# Patient Record
Sex: FEMALE | Race: BLACK OR AFRICAN AMERICAN | NOT HISPANIC OR LATINO | Employment: UNEMPLOYED | URBAN - METROPOLITAN AREA
[De-identification: names, ages, dates, MRNs, and addresses within clinical notes are randomized per-mention and may not be internally consistent; named-entity substitution may affect disease eponyms.]

---

## 2022-11-09 ENCOUNTER — TELEPHONE (OUTPATIENT)
Dept: GASTROENTEROLOGY | Facility: CLINIC | Age: 52
End: 2022-11-09

## 2022-11-09 NOTE — TELEPHONE ENCOUNTER
Spoke to pt regarding message below. Informed pt I don't see an referral in epic and provided pt with the number and fax number to [facility] concierge dept. Pt verbalize understand and thank me   ----- Message from Antonette Gray sent at 11/9/2022  2:45 PM CST -----  Contact: pt  Pt states that paperwork and medical records should have been sent over to the office for her already. She states that she needs to make an appt, but she is unsure who to schedule with for her particular situation and would like to speak to someone in the office to be scheduled.    Contact Number: [phone]

## 2022-12-14 ENCOUNTER — TELEPHONE (OUTPATIENT)
Dept: ENDOSCOPY | Facility: HOSPITAL | Age: 52
End: 2022-12-14
Payer: COMMERCIAL

## 2022-12-14 NOTE — TELEPHONE ENCOUNTER
----- Message from Dodie Edmond sent at 12/14/2022 11:38 AM CST -----  Contact: [phone]  PT, would like to Scheduled a  Appointment, please contact @ number provided      [phone]     ED, Referral Doctor, Dr. Jesika Acuña, would like Pt to see a Gastro Doctor for     pain and fluid in the pt, abdomen    Also, Anemia

## 2023-02-06 ENCOUNTER — TELEPHONE (OUTPATIENT)
Dept: GASTROENTEROLOGY | Facility: CLINIC | Age: 53
End: 2023-02-06
Payer: COMMERCIAL

## 2023-02-06 NOTE — TELEPHONE ENCOUNTER
----- Message from Joeclyn Bradley sent at 2/6/2023 11:11 AM CST -----  Regarding: Appt  Contact: pt [phone]  [facility] is calling to check the status of referral sent, please call

## 2023-02-06 NOTE — TELEPHONE ENCOUNTER
Return call to patient inform patient that we have not received the records and she can send to [phone].     Patient verbalized understanding.

## 2023-02-22 ENCOUNTER — TELEPHONE (OUTPATIENT)
Dept: ENDOSCOPY | Facility: HOSPITAL | Age: 53
End: 2023-02-22

## 2023-02-22 ENCOUNTER — TELEPHONE (OUTPATIENT)
Dept: TRANSPLANT | Facility: CLINIC | Age: 53
End: 2023-02-22
Payer: COMMERCIAL

## 2023-02-22 NOTE — TELEPHONE ENCOUNTER
----- Message from Edith Song sent at 2/22/2023 12:09 PM CST -----  I rec'clifton a call from Jesika leal/ Dr. Hardy Calixto(P: [phone]) about get this pt miguel'ed for a double-balloon endoscopy. Her recs have been scanned into media.

## 2023-02-22 NOTE — TELEPHONE ENCOUNTER
----- Message from Edith Song sent at 2/22/2023 12:16 PM CST -----    Rec'clifton a call from Jesika leal/ Dr. Hardy Calixto(P: [phone]) about get this pt miguel'ed to see Dr. Baeza for a double-balloon endoscopy. Explained that this is the office of Dr. MERCY leal/hepat, not RYLIE leal/GI. Told her that I will send a message to GI for them to get them to give the pt a call.  .

## 2023-05-23 ENCOUNTER — TELEPHONE (OUTPATIENT)
Dept: TRANSPLANT | Facility: CLINIC | Age: 53
End: 2023-05-23
Payer: COMMERCIAL

## 2023-05-25 ENCOUNTER — TELEPHONE (OUTPATIENT)
Dept: TRANSPLANT | Facility: CLINIC | Age: 53
End: 2023-05-25
Payer: COMMERCIAL

## 2023-05-26 ENCOUNTER — TELEPHONE (OUTPATIENT)
Dept: TRANSPLANT | Facility: CLINIC | Age: 53
End: 2023-05-26
Payer: COMMERCIAL

## 2023-05-26 DIAGNOSIS — Z76.82 ORGAN TRANSPLANT CANDIDATE: Primary | ICD-10-CM

## 2023-07-20 ENCOUNTER — TELEPHONE (OUTPATIENT)
Dept: TRANSPLANT | Facility: CLINIC | Age: 53
End: 2023-07-20
Payer: COMMERCIAL

## 2023-07-20 NOTE — TELEPHONE ENCOUNTER
----- Message from Ariana Han sent at 7/20/2023 11:55 AM CDT -----  Regarding: confirm 7/27 appt  The patient called confirming appts on 7/27    No further information provided      Patient can be contacted @# [phone] (home)

## 2023-07-21 ENCOUNTER — TELEPHONE (OUTPATIENT)
Dept: TRANSPLANT | Facility: CLINIC | Age: 53
End: 2023-07-21
Payer: COMMERCIAL

## 2023-07-25 ENCOUNTER — TELEPHONE (OUTPATIENT)
Dept: TRANSPLANT | Facility: CLINIC | Age: 53
End: 2023-07-25
Payer: COMMERCIAL

## 2023-07-26 ENCOUNTER — TELEPHONE (OUTPATIENT)
Dept: TRANSPLANT | Facility: CLINIC | Age: 53
End: 2023-07-26
Payer: COMMERCIAL

## 2023-07-26 NOTE — TELEPHONE ENCOUNTER
MA notes per adherence form     FOR THE PAST THREE MONTHS:    0-AMA's  0-No-shows    No concerns with care giving, transportation, or mental health    Brought over to clinic to be scanned in.    Sheba Alberts  Abdominal Transplant MA

## 2023-07-27 ENCOUNTER — HOSPITAL ENCOUNTER (OUTPATIENT)
Dept: RADIOLOGY | Facility: HOSPITAL | Age: 53
Discharge: HOME | End: 2023-07-27
Payer: MEDICARE

## 2023-07-27 ENCOUNTER — DOCUMENTATION ONLY (OUTPATIENT)
Dept: TRANSPLANT | Facility: CLINIC | Age: 53
End: 2023-07-27

## 2023-07-27 ENCOUNTER — TELEPHONE (OUTPATIENT)
Dept: TRANSPLANT | Facility: CLINIC | Age: 53
End: 2023-07-27
Payer: COMMERCIAL

## 2023-07-27 ENCOUNTER — OFFICE VISIT (OUTPATIENT)
Dept: TRANSPLANT | Facility: CLINIC | Age: 53
End: 2023-07-27
Payer: MEDICARE

## 2023-07-27 VITALS
HEART RATE: 72 BPM | DIASTOLIC BLOOD PRESSURE: 52 MMHG | WEIGHT: 182.13 LBS | RESPIRATION RATE: 16 BRPM | HEIGHT: 65 IN | BODY MASS INDEX: 30.34 KG/M2 | TEMPERATURE: 98 F | OXYGEN SATURATION: 98 % | SYSTOLIC BLOOD PRESSURE: 95 MMHG

## 2023-07-27 DIAGNOSIS — Z76.82 ORGAN TRANSPLANT CANDIDATE: ICD-10-CM

## 2023-07-27 DIAGNOSIS — Z99.2 ESRD ON HEMODIALYSIS: ICD-10-CM

## 2023-07-27 DIAGNOSIS — N05.1 FSGS (FOCAL SEGMENTAL GLOMERULOSCLEROSIS): ICD-10-CM

## 2023-07-27 DIAGNOSIS — Z95.1 PRESENCE OF AORTOCORONARY BYPASS GRAFT: ICD-10-CM

## 2023-07-27 DIAGNOSIS — N18.6 ESRD ON HEMODIALYSIS: ICD-10-CM

## 2023-07-27 DIAGNOSIS — T86.12 FAILED KIDNEY TRANSPLANT: ICD-10-CM

## 2023-07-27 DIAGNOSIS — Z01.818 PRE-TRANSPLANT EVALUATION FOR KIDNEY TRANSPLANT: Primary | ICD-10-CM

## 2023-07-27 PROBLEM — K21.9 GASTROESOPHAGEAL REFLUX DISEASE: Status: ACTIVE | Noted: 2023-01-23

## 2023-07-27 PROCEDURE — 99215 OFFICE O/P EST HI 40 MIN: CPT | Mod: PBBFAC,25,TXP | Performed by: NURSE PRACTITIONER

## 2023-07-27 PROCEDURE — 99204 OFFICE O/P NEW MOD 45 MIN: CPT | Mod: S$PBB,TXP,, | Performed by: PHYSICIAN ASSISTANT

## 2023-07-27 PROCEDURE — 99205 PR OFFICE/OUTPT VISIT, NEW, LEVL V, 60-74 MIN: ICD-10-PCS | Mod: S$PBB,TXP,, | Performed by: NURSE PRACTITIONER

## 2023-07-27 PROCEDURE — 76770 US RETROPERITONEAL COMPLETE: ICD-10-PCS | Mod: 26,TXP,, | Performed by: RADIOLOGY

## 2023-07-27 PROCEDURE — 99205 OFFICE O/P NEW HI 60 MIN: CPT | Mod: S$PBB,TXP,, | Performed by: NURSE PRACTITIONER

## 2023-07-27 PROCEDURE — 93978 VASCULAR STUDY: CPT | Mod: TC,TXP

## 2023-07-27 PROCEDURE — 99204 PR OFFICE/OUTPT VISIT, NEW, LEVL IV, 45-59 MIN: ICD-10-PCS | Mod: S$PBB,TXP,, | Performed by: TRANSPLANT SURGERY

## 2023-07-27 PROCEDURE — 76776 US EXAM K TRANSPL W/DOPPLER: CPT | Mod: 26,TXP,, | Performed by: RADIOLOGY

## 2023-07-27 PROCEDURE — 76770 US EXAM ABDO BACK WALL COMP: CPT | Mod: 26,TXP,, | Performed by: RADIOLOGY

## 2023-07-27 PROCEDURE — 93978 VASCULAR STUDY: CPT | Mod: 26,TXP,, | Performed by: RADIOLOGY

## 2023-07-27 PROCEDURE — 99204 PR OFFICE/OUTPT VISIT, NEW, LEVL IV, 45-59 MIN: ICD-10-PCS | Mod: S$PBB,TXP,, | Performed by: PHYSICIAN ASSISTANT

## 2023-07-27 PROCEDURE — 99204 OFFICE O/P NEW MOD 45 MIN: CPT | Mod: S$PBB,TXP,, | Performed by: TRANSPLANT SURGERY

## 2023-07-27 PROCEDURE — 93978 US DOPP ILIACS BILATERAL: ICD-10-PCS | Mod: 26,TXP,, | Performed by: RADIOLOGY

## 2023-07-27 PROCEDURE — 99999 PR PBB SHADOW E&M-EST. PATIENT-LVL V: CPT | Mod: PBBFAC,TXP,, | Performed by: NURSE PRACTITIONER

## 2023-07-27 PROCEDURE — 99999 PR PBB SHADOW E&M-EST. PATIENT-LVL V: ICD-10-PCS | Mod: PBBFAC,TXP,, | Performed by: NURSE PRACTITIONER

## 2023-07-27 PROCEDURE — 76776 US TRANSPLANT KIDNEY WITH DOPPLER: ICD-10-PCS | Mod: 26,TXP,, | Performed by: RADIOLOGY

## 2023-07-27 PROCEDURE — 76776 US EXAM K TRANSPL W/DOPPLER: CPT | Mod: TC,TXP

## 2023-07-27 PROCEDURE — 76770 US EXAM ABDO BACK WALL COMP: CPT | Mod: TC,TXP

## 2023-07-27 RX ORDER — ISOSORBIDE MONONITRATE 60 MG/1
60 TABLET, EXTENDED RELEASE ORAL DAILY
COMMUNITY
Start: 2023-06-02

## 2023-07-27 RX ORDER — AMLODIPINE BESYLATE 10 MG/1
10 TABLET ORAL DAILY
COMMUNITY
Start: 2023-06-02

## 2023-07-27 RX ORDER — LOSARTAN POTASSIUM 100 MG/1
100 TABLET ORAL NIGHTLY
COMMUNITY
Start: 2023-06-02

## 2023-07-27 RX ORDER — FLUOXETINE HYDROCHLORIDE 40 MG/1
40 CAPSULE ORAL DAILY
COMMUNITY
Start: 2023-06-05

## 2023-07-27 RX ORDER — FAMOTIDINE 40 MG/1
40 TABLET, FILM COATED ORAL 2 TIMES DAILY
COMMUNITY
Start: 2023-06-02

## 2023-07-27 RX ORDER — PANTOPRAZOLE SODIUM 40 MG/1
40 TABLET, DELAYED RELEASE ORAL DAILY
COMMUNITY
Start: 2023-07-22

## 2023-07-27 RX ORDER — ASPIRIN 81 MG/1
81 TABLET ORAL DAILY
COMMUNITY
Start: 2023-06-02

## 2023-07-27 RX ORDER — ATORVASTATIN CALCIUM 40 MG/1
40 TABLET, FILM COATED ORAL DAILY
COMMUNITY
Start: 2023-05-08

## 2023-07-27 RX ORDER — CARVEDILOL 25 MG/1
25 TABLET ORAL 2 TIMES DAILY
COMMUNITY
Start: 2023-06-02

## 2023-07-27 NOTE — TELEPHONE ENCOUNTER
Reviewed pt transplant labs.  Notified dialysis unit dietitian of the following abnormal labs via fax and requested their most recent nutrition note on this pt.  Once this note is received it will be scanned into pt's chart.     Cholesterol 109  Albumin 3.4

## 2023-07-27 NOTE — PROGRESS NOTES
INITIAL PATIENT EDUCATION NOTE    Ms. Lupe Richards was seen in pre-kidney transplant clinic for evaluation for kidney, kidney/pancreas or pancreas only transplant.  The patient attended a an individual video education session that discussed/reviewed the following aspects of transplantation: evaluation including diagnostic and laboratory testing,( Chemistries, Hematology, Serologies including HIV and Hepatitis and HLA) required for transplantation and selection committee process, UNOS waitlist management/multiple listings, types of organs offered (KDPI < 85%, KDPI > 85%, PHS risk, DCD, HCV+, HIV+ for HIV+ recipients and enbloc/dual), financial aspects, surgical procedures, dietary instruction pre- and post-transplant, health maintenance pre- and post-transplant, post-transplant hospitalization and outpatient follow-up, potential to participate in a research protocol, and medication management and side effects.  A question and answer session was provided after the presentation.    The patient was seen by all members of the multi-disciplinary team to include: Nephrologist/MAITE, Surgeon, , Transplant Coordinator, , Pharmacist and Dietician (if applicable).    The patient reviewed and signed all consents for evaluation which were witnessed and sent to scanning into the [facility] chart.    The patient was given an education book and plan for further evaluation based on her individual assessment.      Reviewed program requirement for complete COVID vaccination with documentation prior to listing.  COVID education information reviewed with patient. Patient encouraged to be up to date on all vaccinations.       The patient was informed that the transplant team would manage immediate post op pain. If the patient requires long term pain management, they will need to have that pain management addressed by their PCP or previous provider who wrote for long term pain medicines.    The patient was  encouraged to call with any questions or concerns.

## 2023-07-27 NOTE — PROGRESS NOTES
PRE-TRANSPLANT INFECTIOUS DISEASE CONSULT    Reason for Visit:  Pre-transplant evaluation  Referring Provider: Dr. Matt August     History of Present Illness:    52 y.o. 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 presents for pre-kidney transplant evaluation. Pt is on HD. LUE AVF. Pt is anuric.     Infectious History:  Recent hospital admissions: Yes +peritonitis 2/2 peritoneal catheter at [facility].   Recent infections: Yes SBP peritoneal catheter removed   Recent or current antibiotic use: No  History of recurrent infections *(sinus / pneumonia / UTI / SBP)*: No  History of diabetic foot wound or bone/joint infection: No  Recent dental infections, issues or procedures: No  History of chicken pox: Yes  History of shingles: No  History of STI: No  History of COVID infection: Yes  Pt saw ID at [facility] Dr. Briggs  2017 pt was on bactrim for PJP ppx. Possibly for lung nodule.???      History of Immunosuppression:  Prior chemotherapy / immunosuppression: Yes discontinued 1/2021 due to kidney failure.   Prior transplant: Yes kidney transplant at [facility] 2017  History of splenectomy: No    Tuberculosis:  Prior screening for latent TB: Yes  Prior diagnosis of latent TB: No  Risk factors for TB *known exposure, incarceration, homelessness*: No    Geographical exposures:  Currently lives in [place] with mother  Lived in the following states: none  Lived or travelled to the [facility] [place]: No  International travel: No  Travel-associated illness: No    Social/Environmental:  Occupation:  nursing  Pets: Yes outdoor dogs, vaccinated   Livestock: No  Fishing / hunting: No  Hobbies: none  Water: [place]  Consumption of raw/undercooked meat or seafood?  No  Tobacco: No  Alcohol: No  Recreational drug use:  No  Sexual partners: none      Past Histories:   Past Medical History:   Diagnosis Date    Anemia     Coronary artery disease     Disorder of kidney and  ureter     Encounter for blood transfusion     GERD (gastroesophageal reflux disease)     Hyperparathyroidism, unspecified     Hypertension     Kidney transplanted     Pneumonia, unspecified organism      labor without delivery, unspecified trimester     Sleep apnea, unspecified     Thyroid disease      Past Surgical History:   Procedure Laterality Date    BONE MARROW BIOPSY      CARDIAC CATHETERIZATION       SECTION      CORONARY ARTERY BYPASS GRAFT      DILATION AND CURETTAGE OF UTERUS      ESOPHAGOGASTRODUODENOSCOPY      INSERTION OF DIALYSIS CATHETER      KIDNEY TRANSPLANT      PARACENTESIS      PARATHYROIDECTOMY      PERITONEAL CATHETER REMOVAL      REMOVAL, CATHETER, DIALYSIS, PERITONEAL, LAPAROSCOPIC      Dialysis Catherter     Family History   Problem Relation Age of Onset    Arthritis Mother     COPD Mother     Cancer Father         lung     Social History     Tobacco Use    Smoking status: Never    Smokeless tobacco: Never   Substance Use Topics    Alcohol use: Not Currently     Review of patient's allergies indicates:   Allergen Reactions    Losartan      Cough         Immunization History:  Received all childhood vaccines: Yes  All household members receive annual flu vaccine: Yes  All household members are up to date on COVID vaccine: Yes      Current antibiotics:  Antibiotics (From admission, onward)      None              Review of Systems  Review of Systems   Constitutional: Negative for chills, decreased appetite, fever, malaise/fatigue, night sweats, weight gain and weight loss.   HENT:  Negative for congestion, ear pain, hearing loss, hoarse voice, sore throat and tinnitus.    Eyes:  Negative for blurred vision, pain, vision loss in left eye, vision loss in right eye and visual disturbance.   Cardiovascular:  Negative for chest pain, dyspnea on exertion, leg swelling and palpitations.   Respiratory:  Negative for cough, shortness of breath, sputum production and wheezing.    Skin:   Negative for dry skin, itching, rash and suspicious lesions.   Musculoskeletal:  Negative for back pain, joint pain, myalgias and neck pain.   Gastrointestinal:  Negative for abdominal pain, constipation, diarrhea, heartburn, nausea and vomiting.   Genitourinary:  Negative for dysuria, flank pain, frequency, hematuria, hesitancy and urgency.   Neurological:  Negative for dizziness, headaches, numbness, paresthesias and weakness.   Psychiatric/Behavioral:  Negative for depression and memory loss. The patient does not have insomnia and is not nervous/anxious.         Objective  Physical Exam  Vitals and nursing note reviewed.   Constitutional:       General: She is not in acute distress.     Appearance: She is well-developed. She is not diaphoretic.   HENT:      Head: Normocephalic and atraumatic.   Eyes:      Pupils: Pupils are equal, round, and reactive to light.   Cardiovascular:      Rate and Rhythm: Normal rate and regular rhythm.      Heart sounds: Normal heart sounds. No murmur heard.    No friction rub. No gallop.   Pulmonary:      Effort: Pulmonary effort is normal. No respiratory distress.      Breath sounds: Normal breath sounds. No wheezing or rales.   Chest:      Chest wall: No tenderness.   Abdominal:      General: Bowel sounds are normal. There is no distension.      Palpations: Abdomen is soft. There is no mass.      Tenderness: There is no abdominal tenderness. There is no guarding or rebound.      Hernia: No hernia is present.   Musculoskeletal:         General: No tenderness or deformity. Normal range of motion.      Cervical back: Normal range of motion and neck supple.   Skin:     General: Skin is warm and dry.      Coloration: Skin is not pale.      Findings: No erythema.      Comments: EMEILA HERNANDEZ c/d/I    Neurological:      Mental Status: She is alert and oriented to person, place, and time.      Cranial Nerves: No cranial nerve deficit.      Coordination: Coordination normal.   Psychiatric:          Behavior: Behavior normal.         Thought Content: Thought content normal.         Labs:    CBC:   Lab Results   Component Value Date    WBC 3.93 07/27/2023    HGB 12.0 07/27/2023    HCT 36.7 (L) 07/27/2023     (H) 07/27/2023     07/27/2023    GRAN 2.0 07/27/2023    GRAN 50.1 07/27/2023    LYMPH 0.7 (L) 07/27/2023    LYMPH 17.3 (L) 07/27/2023    MONO 0.5 07/27/2023    MONO 13.2 07/27/2023    EOSINOPHIL 18.1 (H) 07/27/2023       Syphilis screening: No results found for: RPR, PRPQ, FTAABS     TB screening: No results found for: TBGOLDPLUS, TSPOTSCREN    HIV screening:   Lab Results   Component Value Date    MFE47GARS Non-reactive 07/27/2023       Strongyloides IgG: No results found for: STRONGANTIGG    Hepatitis Serologies:   Lab Results   Component Value Date    HEPAIGG Non-reactive 07/27/2023    HEPBCAB Non-reactive 07/27/2023    HEPBSAB <3.00 07/27/2023    HEPBSAB Non-reactive 07/27/2023    HEPCAB Non-reactive 07/27/2023        Varicella IgG: No results found for: VARICELLAINT      Immunization History   Administered Date(s) Administered    COVID-19, MRNA, LN-S, PF (Pfizer) (Purple Cap) 02/25/2021, 03/18/2021, 01/14/2022    COVID-19, mRNA, LNP-S, bivalent booster, PF (PFIZER OMICRON) 02/24/2023    DTP 02/15/1973, 03/30/1973, 08/06/1973, 08/31/1974, 08/11/1979    Hepatitis A / Hepatitis B 05/07/2007    Hepatitis B, Adult 10/06/2004, 06/09/2008, 07/17/2008, 08/20/2008, 11/13/2008    Hib-HbOC 08/01/1994    Influenza 10/10/2012    Influenza (FLUBLOK) - Quadrivalent - Recombinant - PF *Preferred* (egg allergy) 09/30/2022    Influenza - Quadrivalent - PF *Preferred* (6 months and older) 01/11/2019, 10/01/2019, 10/11/2021    Influenza - Trivalent - PF (ADULT) 10/29/2009, 10/19/2010, 11/11/2011, 10/18/2013, 11/06/2014, 10/21/2015    MMR 12/09/1993    Measles 12/28/1973    Mumps 08/11/1979    OPV 03/30/1973, 08/06/1973, 08/31/1974, 02/15/1993    Pneumococcal Conjugate - 13 Valent 10/27/2015    Pneumococcal  Polysaccharide - 23 Valent 10/19/2012, 08/01/2022    Rubella 08/11/1979    Td (ADULT) 10/09/1991          Assessment and Plan    1. Risks of Infection: Available serologies were reviewed. No unusual risks of infection or significant barriers to transplantation were identified from the infectious disease standpoint given the information available at this time.    - Acute infectious issues: None  although hx of seeing ID at [facility] after kidney transplant 2017. Pt unable to obtain history. Lung nodule? CT chest 3/2023 with vague groundglass opacity identified in the right upper lobe laterally, infection versus scar? Denies latent TB diagnosis or treatment.    - Pending serologies: Quantiferon gold / T-spot, RPR, Strongyloides IgG, and VZV IgG   - Please call if any pending serologic testing is positive.    2. Immunizations:  Based on the patient's immunization history and serologies, the following immunizations are recommended:  - Hepatitis A    Patient does not have immunity to hepatitis A    Vaccination ordered today: Yes   - Hepatitis B    Patient does not have immunity to hepatitis B    Vaccination ordered today: Yes   - COVID    Current CDC vaccination recommendations were discussed with the patient   - Annual high dose influenza     Vaccination ordered today: No. Reason for not ordering: vaccination up to date   - Prevnar 20    Vaccination ordered today: Yes   - Tdap    Vaccination ordered today: Yes   - Shingrix    Vaccination ordered today: Yes    Recommended Pre-Transplant Immunization Schedule   Vaccine  0m 1m 2m 6m   Pneumococcal conjugate vaccine (Prevnar 20) X      Tetanus-diphtheria-pertussis (Tdap)* X      Hepatitis A Vaccine (Havrix)** X   X   Hepatitis B Vaccine (Heplisav)** X X     Influenza (annual) X      Zoster Recombinant Vaccine (Shingrix) X  X           *Administer booster every 10 years.       **Administer if no immunity demonstrated on serologies               Patient will receive vaccines at  local pharmacy. A written prescription was provided for all vaccine doses.     3. Counseling:   I discussed with the patient the risk for increased susceptibility to infections following transplantation including increased risk for infection right after transplant and if rejection should occur.  The patient has been counseled on the importance of vaccinations to decrease risk of infection and severe illness. Specific guidance has been provided to the patient regarding the patient's occupation, hobbies and activities to avoid future infectious complications.     4. Transplant Candidacy: Based on available information, there are no identified significant barriers to transplantation from an infectious disease standpoint.  Final determination of transplant candidacy will be made once evaluation is complete and reviewed by the Selection Committee.      Follow up with infectious disease as needed.       The total time for evaluation and management services performed on 07/27/2023 was greater than 35 minutes.

## 2023-07-27 NOTE — PROGRESS NOTES
Transplant Surgery  Kidney Transplant Recipient Evaluation    Referring Physician: Matt August  Current Nephrologist: Matt August    Subjective:     Reason for Visit: evaluate transplant candidacy    History of Present Illness: Lupe Richards is a 52 y.o. year old female undergoing transplant evaluation.    Dialysis History: Lupe is on hemodialysis.      Transplant History: N/A    Etiology of Renal Disease: Hypertensive Nephrosclerosis (based on medical records from referral).    External provider notes reviewed: Yes    Review of Systems   Constitutional:  Negative for activity change and appetite change.   HENT:  Negative for congestion and facial swelling.    Eyes:  Negative for photophobia and visual disturbance.   Respiratory:  Negative for cough and shortness of breath.    Cardiovascular:  Negative for chest pain and leg swelling.   Gastrointestinal:  Negative for abdominal distention and abdominal pain.   Endocrine: Negative for polydipsia and polyuria.   Genitourinary:  Negative for frequency and urgency.   Musculoskeletal:  Negative for back pain and myalgias.   Skin:  Negative for color change and wound.   Neurological:  Negative for tremors and headaches.   Hematological:  Negative for adenopathy. Does not bruise/bleed easily.   Psychiatric/Behavioral:  Negative for agitation and confusion.    Objective:     Physical Exam:  Constitutional:   Vitals reviewed: yes   Well-nourished and well-groomed: yes  Eyes:   Sclerae icteric: no   Extraocular movements intact: yes  GI:    Bowel sounds normal: yes   Tenderness: no    If yes, quadrant/location: not applicable   Palpable masses: no    If yes, quadrant/location: not applicable   Hepatosplenomegaly: no   Ascites: no   Hernia: no    If yes, type/location: not applicable   Surgical scars: yes    If yes, type/location: right kidney transplant  not applicable  Resp:   Effort normal: yes   Breath sounds normal: yes    CV:   Regular rate and rhythm:  yes   Heart sounds normal: yes   Femoral pulses normal: yes   Extremities edematous: no  Skin:   Rashes or lesions present: no    If yes, describe:not applicable   Jaundice:: no    Musculoskeletal:   Gait normal: yes   Strength normal: yes  Psych:   Oriented to person, place, and time: yes   Affect and mood normal: yes    Additional comments: not applicable    Diagnostics:  The following labs have been reviewed: CBC  BMP  The following radiology images have been independently reviewed and interpreted:     Counseling: We provided Lupe Richards with a group education session today.  We discussed kidney transplantation at length with her, including risks, potential complications, and alternatives in the management of her renal failure.  The discussion included complications related to anesthesia, bleeding, infection, primary nonfunction, and ATN.  I discussed the typical postoperative course, length of hospitalization, the need for long-term immunosuppression, and the need for long-term routine follow-up.  I discussed living-donor and -donor transplantation and the relative advantages and disadvantages of each.  I also discussed average waiting times for both living donation and  donation.  I discussed national and center-specific survival rates.  I also mentioned the potential benefit of multicenter listing to candidates listed with centers within more than one organ procurement organization.  All questions were answered.    Patient advised that it is recommended that all transplant candidates, and their close contacts and household members receive Covid vaccination.    Final determination of transplant candidacy will be made once evaluation is complete and reviewed by the Kidney & Kidney/Pancreas Selection Committee.    Coronavirus disease (COVID-19) caused by severe acute respiratory virus coronavirus 2 (SARS-C0V 2) is associated with increased mortality in solid organ transplant recipients (SOT)  compared to non-transplant patients. Vaccine responses to vaccination are depressed against SARS-CoV2 compared to normal individuals but improve with third vaccination doses. Vaccination prior to SOT provides both the best opportunity for transplant candidates to develop protective immunity and to reduce the risk of serious COVID19 infections post transplantation. Organ transplant candidates at [facility] will be required to receive SARS-CoV-2 vaccination prior to being listed with a an active status, whenever possible. Exceptions will be made for disability related reasons or for sincerely held Yazidi beliefs.          Transplant Surgery - Candidacy   Assessment/Plan:   Lupe Richards has end stage renal disease (ESRD) on dialysis. I see no surgical contraindication to placing a kidney transplant. Based on available information, Lupe Richards is a suitable kidney re-transplant candidate. I strongly encouraged living donation. Her last kidney only lasted 3-4 years and she expressed concerns about the quality of kidney she will get with a re-transplant. I discussed the KDPI metric and the option to speak with a surgeon if she has any questions or concerns about kidney offers. I emphasized living donation as her best option.    Additional testing to be completed according to the Written Order Guidelines for Adult Pre-kidney and Pancreas Transplant Evaluation (KI-02).  Interpretation of tests and discussion of patient management involves all members of the multidisciplinary transplant team.    Isai Gallagher MD

## 2023-07-27 NOTE — PROGRESS NOTES
PHARM.D. PRE-TRANSPLANT NOTE:    This patient's medication therapy was evaluated as part of her pre-transplant evaluation.      The following general pharmacologic concerns were noted: none - restart fluoxetine post-transplant     The following concerns for post-operative pain management were noted: none     The following pharmacologic concerns related to HCV therapy were noted: none      This patient's medication profile was reviewed for considerations for DAA Hepatitis C therapy:    [X]  No current inducers of CYP 3A4 or PGP  [X]  No amiodarone on this patient's EMR profile in the last 24 months  [X]  No past or current atrial fibrillation on this patient's EMR profile       Current Outpatient Medications   Medication Sig Dispense Refill    amLODIPine (NORVASC) 10 MG tablet Take 10 mg by mouth once daily.      aspirin (ECOTRIN) 81 MG EC tablet Take 81 mg by mouth once daily.      atorvastatin (LIPITOR) 40 MG tablet Take 40 mg by mouth once daily.      carvediloL (COREG) 25 MG tablet Take 25 mg by mouth 2 (two) times daily.      famotidine (PEPCID) 40 MG tablet Take 40 mg by mouth 2 (two) times daily.      FLUoxetine 40 MG capsule Take 40 mg by mouth once daily.      isosorbide mononitrate (IMDUR) 60 MG 24 hr tablet Take 60 mg by mouth once daily.      losartan (COZAAR) 100 MG tablet Take 100 mg by mouth every evening.      pantoprazole (PROTONIX) 40 MG tablet Take 40 mg by mouth once daily.       No current facility-administered medications for this visit.           I am available for consultation and can be contacted, as needed by the other members of the Transplant team.

## 2023-07-27 NOTE — PROGRESS NOTES
Transplant Nephrology  Kidney Transplant Recipient Evaluation    Referring Physician: Matt August  Current Nephrologist: Matt August    Subjective:   CC:  Initial evaluation of kidney transplant candidacy.    HPI:  Ms. Richards is a 52 y.o. year old Black or  female who has presented to be evaluated as a potential kidney transplant recipient.  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  Patient is currently on hemodialysis started on 3/19/2021. Patient is dialyzing on MWF schedule.  Patient reports that she is tolerating dialysis well.. She has a LUE AV fistula for dialysis access.     She reports was diagnosed with FSGS in early  and received treatment with steroids and PLEX for about 4 year before initiating PD in . Underwent  donor transplant at [facility] in 2017. Kidney never achieved creatinine less than 2.5. She had several episodes of rejection, reports compliance with IS medications. Denies recurrence of FSGS in allograft. Returned to HD in . Allograft remains in place RLQ, she is off all IS. Is currently going through evaluation at [facility] as well, was denied listing at [facility] due to vascular disease.     Had CABG x 2 in , had recent echocardiogram which is acceptable. Scheduled for stress test for [facility].    Had complications from a paracentesis that resulted in several blood transfusions. Ascites felt to be nephrogenic, has not returned.     Remains active. Uses no assistive devices. Good upper and lower body strength. Looks good, not frail.    Denies CVA, DVT/PE, or malignancy history. No potential donors at this time.      Current Outpatient Medications   Medication Sig Dispense Refill    amLODIPine (NORVASC) 10 MG tablet Take 10 mg by mouth once daily.      aspirin (ECOTRIN) 81 MG EC tablet Take 81 mg by mouth once daily.      atorvastatin (LIPITOR) 40 MG tablet Take 40 mg by mouth once daily.      carvediloL (COREG) 25 MG tablet Take 25 mg  by mouth 2 (two) times daily.      famotidine (PEPCID) 40 MG tablet Take 40 mg by mouth 2 (two) times daily.      FLUoxetine 40 MG capsule Take 40 mg by mouth once daily.      isosorbide mononitrate (IMDUR) 60 MG 24 hr tablet Take 60 mg by mouth once daily.      losartan (COZAAR) 100 MG tablet Take 100 mg by mouth every evening.      pantoprazole (PROTONIX) 40 MG tablet Take 40 mg by mouth once daily.       No current facility-administered medications for this visit.       Past Medical History:   Diagnosis Date    Anemia     Coronary artery disease     Disorder of kidney and ureter     Encounter for blood transfusion     GERD (gastroesophageal reflux disease)     Hyperparathyroidism, unspecified     Hypertension     Kidney transplanted     Pneumonia, unspecified organism      labor without delivery, unspecified trimester     Sleep apnea, unspecified     Thyroid disease        Past Medical and Surgical History: Ms. Richards  has a past medical history of Anemia, Coronary artery disease, Disorder of kidney and ureter, Encounter for blood transfusion, GERD (gastroesophageal reflux disease), Hyperparathyroidism, unspecified, Hypertension, Kidney transplanted, Pneumonia, unspecified organism,  labor without delivery, unspecified trimester, Sleep apnea, unspecified, and Thyroid disease.  She has a past surgical history that includes  section; Dilation and curettage of uterus; Insertion of dialysis catheter; Parathyroidectomy; Kidney transplant; Peritoneal catheter removal; Paracentesis; Cardiac catheterization; Coronary artery bypass graft; removal, catheter, dialysis, peritoneal, laparoscopic; Bone marrow biopsy; and Esophagogastroduodenoscopy.    Past Social and Family History: Ms. Richards reports that she has never smoked. She has never used smokeless tobacco. She reports that she does not currently use alcohol. Her family history includes Arthritis in her mother; COPD in her mother; Cancer in her  "father.    Review of Systems   Constitutional:  Positive for fatigue. Negative for activity change, appetite change and fever.   HENT:  Negative for congestion, mouth sores and sore throat.    Eyes:  Negative for visual disturbance.   Respiratory:  Negative for cough, chest tightness and shortness of breath.    Cardiovascular:  Negative for chest pain, palpitations and leg swelling.   Gastrointestinal:  Negative for abdominal distention, constipation, diarrhea and nausea.   Genitourinary:  Positive for decreased urine volume. Negative for difficulty urinating, frequency and hematuria.        ANURIC   Musculoskeletal:  Negative for arthralgias, gait problem and myalgias.   Skin:  Negative for wound.   Allergic/Immunologic: Negative for environmental allergies, food allergies and immunocompromised state.   Neurological:  Negative for dizziness, weakness and numbness.   Psychiatric/Behavioral:  Negative for sleep disturbance. The patient is not nervous/anxious.      Objective:   Blood pressure (!) 95/52, pulse 72, temperature 97.7 °F (36.5 °C), temperature source Temporal, resp. rate 16, height 5' 4.8" (1.646 m), weight 82.6 kg (182 lb 1.6 oz), SpO2 98 %.body mass index is 30.49 kg/m².    Physical Exam  Vitals and nursing note reviewed.   Constitutional:       Appearance: Normal appearance.   HENT:      Head: Normocephalic.   Cardiovascular:      Rate and Rhythm: Normal rate and regular rhythm.      Heart sounds: Normal heart sounds.   Pulmonary:      Effort: Pulmonary effort is normal.      Breath sounds: Normal breath sounds.   Abdominal:      General: Bowel sounds are normal. There is no distension.      Palpations: Abdomen is soft.      Tenderness: There is no abdominal tenderness.      Comments: RLQ transplant   Musculoskeletal:         General: Normal range of motion.      Comments: LUE AV fistula + thrill    Skin:     General: Skin is warm and dry.   Neurological:      General: No focal deficit present.      " Mental Status: She is alert.   Psychiatric:         Behavior: Behavior normal.       Labs:  Lab Results   Component Value Date    WBC 3.93 07/27/2023    HGB 12.0 07/27/2023    HCT 36.7 (L) 07/27/2023     07/27/2023    K 4.0 07/27/2023    CL 99 07/27/2023    CO2 27 07/27/2023    BUN 38 (H) 07/27/2023    CREATININE 5.5 (H) 07/27/2023    EGFRNORACEVR 8.9 (A) 07/27/2023    CALCIUM 10.0 07/27/2023    PHOS 4.5 07/27/2023    ALBUMIN 3.4 (L) 07/27/2023    AST 25 07/27/2023    ALT 21 07/27/2023    PTH 26.2 07/27/2023     Labs were reviewed with the patient.    Assessment:     1. Pre-transplant evaluation for kidney transplant    2. ESRD on hemodialysis    3. Failed kidney transplant [id number]    4. FSGS (focal segmental glomerulosclerosis)    5. Presence of aortocoronary bypass graft    6. BMI 30.0-30.9,adult      Plan:   52 y.o. year old  female who has presented to be evaluated as a potential kidney transplant recipient.  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  Patient is currently on hemodialysis started on 3/19/2021. History of failed kidney transplant [id number] at [facility]. Will need stress test and cardiac clearance due to CAD/CABG history. Will need updated MMG. Transplant records from [facility] would be helpful, but should not hold up her presentation. Will need surgical review of her vasculature as she was denied at [facility] due to vascular disease. She is aware that she is highly sensitized with cPRA 100% and implications for extended waiting time, encouraged living donation.  Discussed risk of recurrence of FSGS in allograft.      Transplant Candidacy:   Based on available information, Ms. Richards is a high-risk kidney transplant candidate.   Meets center eligibility for accepting HCV+ donor offer - Yes.  Patient educated on HCV+ donors. Lupe is willing to accept HCV+ donor offer - Yes   Patient is a candidate for KDPI > 85 kidney donor offer - Yes.  Final determination  of transplant candidacy will be made once workup is complete and reviewed by the selection committee.    Patient advised that it is recommended that all transplant candidates, and their close contacts and household members receive Covid vaccination.    UNOS Patient Status  Functional Status: 90% - Able to carry on normal activity: minor symptoms of disease    Alanis Sanon, NP

## 2023-07-27 NOTE — LETTER
July 31, 2023        Matt August  [address]  [facility] [place] [address]  Phone: [phone]  Fax: [phone]             [facility]- Transplant 1st Fl  [address]  [facility] [address]  Phone: [phone]   Patient: Lupe Richards   MR Number: [id number]   YOB: 1970   Date of Visit: 7/27/2023       Dear Dr. Matt August    Thank you for referring Lupe Richards to me for evaluation. Attached you will find relevant portions of my assessment and plan of care.    If you have questions, please do not hesitate to call me. I look forward to following Lupe Richards along with you.    Sincerely,    Alanis Sanon NP    Enclosure    If you would like to receive this communication electronically, please contact [email or web address] or [phone] to request [employer] Link access.    [employer] Link is a tool which provides read-only access to select patient information with whom you have a relationship. Its easy to use and provides real time access to review your patients record including encounter summaries, notes, results, and demographic information.    If you feel you have received this communication in error or would no longer like to receive these types of communications, please e-mail [email or web address]

## 2023-07-31 NOTE — PROGRESS NOTES
Transplant Recipient Adult Psychosocial Assessment    Lupe Richards  [address]  [place] [address]  Telephone Information:   Mobile [phone]   Home  [phone] (home)  Work  There is no work phone number on file.  E-mail  [email or web address]    Sex: female  YOB: 1970  Age: 52 y.o.    Encounter Date: 2023  [place] Citizen: yes  Primary Language: English   Needed: no    Emergency Contact:  Name: Susan Richards  Relationship: mother  Address: [address] [place] [address] [address] [place] [address]  Phone Numbers:  [phone] (mobile)    Family/Social Support:   Number of dependents/: 0  Marital history: Pt reports never being .  Other family dynamics: Pt reports having 1  child.    Household Composition:  Name: Susan Richards  Age: 77  Relationship: mother  Does person drive? yes    Name: Jensen Richards  Age: 18  Relationship:  Nephew  Does person drive? yes      Do you and your caregivers have access to reliable transportation? yes  PRIMARY CAREGIVER: Lilliana Saini, PhD (cousin) will be primary caregiver, phone number [phone]     Able to take time off work without financial concerns: yes. Caregiver reports being retired.    Additional Significant Others who will Assist with Transplant:  Name: Ericka Song  Age: 51  City: [place]: [place]  Relationship:  Cousin  Does person drive? yes    Name: Robi Richards  Age: 52  City: [place]  Relationship: brother  Does person drive? yes    Name: Anaya Richards  Age: 55  City: [place]: [place]  Relationship: mother-in-law  Does person drive? Yes    Living Will: no .  Healthcare Power of : no  Advance Directives on file: <<no information> per medical record.  Verbally reviewed LW/HCPA information.   provided patient with copy of LW/HCPA documents and provided education on completion of forms    Living Donors: No.    Highest Education Level: Associate/Bachelor Degree  Reading Ability:  college  Reports difficulty with: N/A  Learns Best By:  Visually     Status: no  VA Benefits: no     Working for Income: No  If no, reason not working: Disability  Spouse/Significant Other Employment: N/A    Disabled: no    Monthly Income:  SS Disability: $1,711  Other household members total: $1,845 (Mom)0    Insurance:   Payer/Plan Subscr  Sex Relation Sub. Ins. ID Effective Group Num   1. BLUE CROSS OF* CHELSEA VIDAL 1970 Female Self [id number]                                   [address] [address], [place] [address]   2. MEDICARE - [place]* CHELSEA VIDAL 1970 Female Self [id number]                                    [address]     Primary Insurance (for UNOS reporting): Private Insurance  Secondary Insurance (for UNOS reporting): Public Insurance - Medicare & Choice    Dialysis Adherence: Patient reports adhering to her dialysis regimen.  Dialysis center reports over last three months that the patient has had 0 AMAs and 0 no-shows.    Infusion Service: patient utilizing? no  Home Health: patient utilizing? no  DME: yes  Pulmonary/Cardiac Rehab: BPC, BiPAP   ADLS:  Pt reports independent with all ADLS, drives, and handles own medication management.      Adherence:  Adherence education and counseling provided.     Per History Section:  Past Medical History:   Diagnosis Date    Anemia     Coronary artery disease     Disorder of kidney and ureter     Encounter for blood transfusion     GERD (gastroesophageal reflux disease)     Hyperparathyroidism, unspecified     Hypertension     Kidney transplanted     Pneumonia, unspecified organism      labor without delivery, unspecified trimester     Sleep apnea, unspecified     Thyroid disease      Social History     Tobacco Use    Smoking status: Never    Smokeless tobacco: Never   Substance Use Topics    Alcohol use: Not Currently     Social History     Substance and Sexual Activity   Drug Use Not on file     Social  History     Substance and Sexual Activity   Sexual Activity Not Currently       Per Today's Psychosocial:  Tobacco: none, patient denies any use.  Alcohol:  Pt reports drinking 1x per month .  Illicit Drugs/Non-prescribed Medications: none, patient denies any use.      Arrests/DWI/Treatment/Rehab: patient denies    Psychiatric History:    Mental Health:  Pt reports becoming depressed following loss of baby and receiving grief counseling at that time.   Psychiatrist/Counselor: Pt reports not actively seeing a psychiatrist/counselor.  Medications:  Pt reports being prescribed Prozac following the loss of her child.  Suicide/Homicide Issues: Pt denies any hx of SI/HI.     Knowledge: Patient states having clear understanding and realistic expectations regarding the potential risks and potential benefits of organ transplantation and organ donation and agrees to discuss with health care team members and support system members, as well as to utilize available resources and express questions and/or concerns in order to further facilitate the pt informed decision-making.  Resources and information provided and reviewed.     Patient is aware of [facility]'s affiliation and/or partnership with agencies in home health care, LTAC, SNF, [facility], and other hospitals and clinics.    Understanding: Patient reports having a clear understanding of the many lifetime commitments involved with being a transplant recipient, including costs, compliance, medications, lab work, procedures, appointments, concrete and financial planning, preparedness, timely and appropriate communication of concerns, abstinence (ETOH, tobacco, illicit non-prescribed drugs), adherence to all health care team recommendations, support system and caregiver involvement, appropriate and timely resource utilization and follow-through, mental health counseling as needed/recommended, and patient and caregiver responsibilities.  Social Service Handbook, resources and detailed  educational information provided and reviewed.  Educational information provided.    Patient also reports current and expected compliance with health care regime, and patient states having a clear understanding of the importance of compliance.  Patient reports a clear understanding that risks and benefits may be involved with organ transplantation and with organ donation.  Patient also reports clear understanding that psychosocial risk factors may affect patient, and include but are not limited to feelings of depression, generalized anxiety, anxiety regarding dependence on others, post traumatic stress disorder, feelings of guilt and other emotional and/or mental concerns, and/or exacerbation of existing mental health concerns.  Detailed resources provided and discussed.  Patient agrees to access appropriate resources in a timely manner as needed and/or as recommended, and to communicate concerns appropriately.  Patient also reports a clear understanding of treatment options available.      Patient and caregiver received education in a group setting.   reviewed education, provided additional information, and answered questions.    Feelings or Concerns: Pt reports relying on prayer and harshil.    Coping: Identify Patient & Caregiver Strategies to [place]:   1. In the past, coping with major surgery and/or related stress - Pt reports relying on prayer and harshil in the past.    2. Currently & Pre-transplant - Pt reports relying on prayer, harshil, and her family.   3. At the time of surgery - Pt reports relying on prayer and harshil at time of surgery.   4. During post-Transplant & Recovery Period - Pt reports relying on family post-transplant and recovery.     Goals: Patient reports looking forward to feeling better, returning to work, finding a , and wearing a two-piece bathing suit..  Patient referred to Vocational Rehabilitation.    Interview Behavior: Patient and caregiver presents as alert and  oriented x 4, pleasant, good eye contact, well groomed, recall good, concentration/judgement good, average intelligence, calm, communicative, cooperative, and asking and answering questions appropriately.           Transplant Social Work - Candidacy  Assessment/Plan:     Psychosocial Suitability: Patient presents as a suitable candidate for kidney transplant at this time. Based on lack of psychosocial risk factors, patient presents as low risk, due to stable familial and financial support, adequate insurance, and possesses strong caregiver plans .    Recommendations/Additional Comments:  recommends patient continues abstaining from tobacco use, ETOH misuse, and use of illicit/non-prescribed drugs.  further recommends patient fund raise and inquire about potential living donors. Patient denies hx of experiencing SI/HI. Patient and caregiver presents with no acute mental health concerns.    Brian Aguilar LCSW

## 2023-08-15 ENCOUNTER — TELEPHONE (OUTPATIENT)
Dept: TRANSPLANT | Facility: CLINIC | Age: 53
End: 2023-08-15
Payer: COMMERCIAL

## 2023-08-15 ENCOUNTER — DOCUMENTATION ONLY (OUTPATIENT)
Dept: TRANSPLANT | Facility: CLINIC | Age: 53
End: 2023-08-15
Payer: COMMERCIAL

## 2023-08-15 ENCOUNTER — EPISODE CHANGES (OUTPATIENT)
Dept: TRANSPLANT | Facility: CLINIC | Age: 53
End: 2023-08-15

## 2023-08-15 NOTE — TELEPHONE ENCOUNTER
Phoned patient and informed of additional lab orders needed. Verbalized understanding, New orders mailed to patient.

## 2023-08-23 ENCOUNTER — EPISODE CHANGES (OUTPATIENT)
Dept: TRANSPLANT | Facility: CLINIC | Age: 53
End: 2023-08-23

## 2023-09-06 ENCOUNTER — TELEPHONE (OUTPATIENT)
Dept: TRANSPLANT | Facility: CLINIC | Age: 53
End: 2023-09-06
Payer: COMMERCIAL

## 2023-09-06 NOTE — TELEPHONE ENCOUNTER
----- Message from Prema Jarrett MA sent at 9/6/2023  8:46 AM CDT -----  Regarding: Labs  Labs were faxed over on Monday.    [facility] [phone]

## 2023-09-06 NOTE — TELEPHONE ENCOUNTER
Spoke w/pt's dialysis [facility] and confirmed that we've received her labs. Pt's mammo and pap is miguel'd 9/14 and stress miguel'd 9/11. [facility] will fax records once complete.

## 2024-02-26 ENCOUNTER — TELEPHONE (OUTPATIENT)
Dept: TRANSPLANT | Facility: CLINIC | Age: 54
End: 2024-02-26
Payer: COMMERCIAL

## 2024-05-07 ENCOUNTER — TELEPHONE (OUTPATIENT)
Dept: TRANSPLANT | Facility: CLINIC | Age: 54
End: 2024-05-07
Payer: COMMERCIAL

## 2024-05-09 ENCOUNTER — TELEPHONE (OUTPATIENT)
Dept: TRANSPLANT | Facility: CLINIC | Age: 54
End: 2024-05-09
Payer: COMMERCIAL

## 2024-09-17 ENCOUNTER — TELEPHONE (OUTPATIENT)
Dept: TRANSPLANT | Facility: CLINIC | Age: 54
End: 2024-09-17
Payer: MEDICARE

## 2024-09-18 ENCOUNTER — TELEPHONE (OUTPATIENT)
Dept: TRANSPLANT | Facility: CLINIC | Age: 54
End: 2024-09-18
Payer: MEDICARE

## 2024-09-18 NOTE — TELEPHONE ENCOUNTER
MA notes per Adherence form     FOR THE PAST THREE MONTHS:    1-AMA's 8/23/24 pt not feeling well , no time given   0-No-shows    No concerns with care giving, transportation, or mental health    Scanned in pt's media    Sheba Alberts  Abdominal Transplant MA

## 2024-09-20 ENCOUNTER — COMMITTEE REVIEW (OUTPATIENT)
Dept: TRANSPLANT | Facility: CLINIC | Age: 54
End: 2024-09-20
Payer: MEDICARE

## 2024-09-20 NOTE — COMMITTEE REVIEW
Native Organ Dx: Hypertensive Nephrosclerosis      Not approved for LRD/CAD transplant due to severe vascular calcifications    Patient informed of committee's decision. Patient declined questions. Patient voiced understanding.     Note written by Yessy Hernandez RN    ===============================================    I was present at the meeting and attest to the general consensus of the committee.   Leno Champion Jr.

## 2024-09-20 NOTE — LETTER
September 20, 2024    CHELSEA Richards   [address]  [place] [address]    Dear CHELSEA Richards:  MRN: [id number]    It is the duty of the [facility] Selection Committee to determine which patients are candidates for a transplant. For this reason, our committee has the difficult task of evaluating patients to determine which ones have the greatest chance of having a successful transplant. We are aware of the magnitude of this responsibility, and we approach it with reverence and humility.    It is with regret I inform you that you are not approved as a transplant candidate due to  severe vascular calcifications (hardening of the vessel) . Based on this review, we have determined that at this time, you are not a candidate for a transplant at [facility].      The selection committee carefully considers each patient's transplant candidacy and determines whether it is safe to proceed with transplantation on a case-by-case basis using established selection criteria.  At present, the risk of proceeding with an elective transplant surgery has become too high.                                                                               Although the selection committee believes you are not a suitable transplant candidate, you have the option to be evaluated at other transplant centers who may have different selection criteria.  You may request your [facility] records be sent to any center of your choice by contacting our Medical Records Department at [phone].                                                                               Attached is a letter from the United Network for Organ Sharing (UNOS).  It describes the services and information offered to patients by UNOS and the Organ Procurement and Transplant Network.    The [facility] Selection Committee sincerely wishes you the best and remains available to answer any questions.  Please do not hesitate to contact our pre-transplant office if we can  assist you in any other way.                                                                               Sincerely,      Kathy Pearson MD  Medical Director, Kidney & Kidney/Pancreas Transplantation    Cc: Matt August MD         [facility]    Encl: UNOS Letter               The Organ Procurement and Transplantation Network   Toll-free patient services line: 7-619-069-5321  Your resource for organ transplant information      Staffed 8:30 am - 5:00 pm ET Monday - Friday   Leave a message 24/7 to receive a call back    The Organ Procurement and Transplantation Network (OPTN) is the national transplant system. It makes the policies that decide how donated organs are matched to patients waiting for a transplant. The OPTN:    Makes sure donated organs get matched to people on the transplant waiting list  Tells people about the donation and transplant processes  Makes sure that the public knows about the need for more organ and tissue donations    The OPTN has a free patient services line that you can call to:  Get more information about:   o Organ donation and organ transplants   o Donation and transplant policies  Get an information kit with:   o A list of transplant hospitals   o Waiting list information  Talk about any questions you may have about your transplant hospital or organ procurement organization. The staff will do their best to help you or point you to others who may help.  Find out how you can volunteer with the OPTN and help shape transplant policy    The patient services line number is: 5-870-354-7786    Patient services line staff CANNOT answer questions about your own medical care, including:  Waiting list status  Test results  Medical records  You will need to call your transplant hospital for this information.    The following websites have more information about transplantation and donation:  OPTN: https://optn.transplant.[facility].gov/  For potential living donors and transplant  recipients:   o Living with transplant: https://www.transplantliving.org/   o Living donation process: https://optn.transplant.hrsa.gov/living-donation/     o Financial assistance: https://www.livingdonorassistance.org/  Transplantation data: https://www.srtr.org/  Organ donation: https://www.organdonor.gov/    Volunteer with the OPTN: https://optn.transplant.hrsa.gov/get-involved

## 2025-02-17 NOTE — TELEPHONE ENCOUNTER
Referral from her nephrologist reviewed. Patient with persistent anemia requiring frequent blood transfusions.  Notes indicate no overt signs of GI bleeding.  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      EGD and colonoscopy for chronic diarrhea and abdominal pain done in July 2022 reports reviewed.  Normal upper endoscopy to the 2nd portion of the duodenum.  Colonoscopy with hemorrhoids; otherwise, normal colon and terminal ileum.      Request is for further evaluation of persistent anemia.  Patient will need VCE.  Will arrange clinic visit.    Triage Note     Pt arrived carried by her mother for abd pain since 5pm today   LBM today   Denies nausea/vomiting